# Patient Record
Sex: FEMALE | Race: WHITE
[De-identification: names, ages, dates, MRNs, and addresses within clinical notes are randomized per-mention and may not be internally consistent; named-entity substitution may affect disease eponyms.]

---

## 2019-11-08 ENCOUNTER — HOSPITAL ENCOUNTER (EMERGENCY)
Dept: HOSPITAL 43 - DL.ED | Age: 59
Discharge: HOME | End: 2019-11-08
Payer: COMMERCIAL

## 2019-11-08 DIAGNOSIS — E66.9: ICD-10-CM

## 2019-11-08 DIAGNOSIS — Z79.899: ICD-10-CM

## 2019-11-08 DIAGNOSIS — N13.2: Primary | ICD-10-CM

## 2019-11-08 DIAGNOSIS — I10: ICD-10-CM

## 2019-11-08 LAB
ANION GAP SERPL CALC-SCNC: 12.1 MMOL/L
CHLORIDE SERPL-SCNC: 102 MMOL/L (ref 101–111)
SODIUM SERPL-SCNC: 135 MMOL/L (ref 135–145)

## 2019-11-08 PROCEDURE — 83690 ASSAY OF LIPASE: CPT

## 2019-11-08 PROCEDURE — 96375 TX/PRO/DX INJ NEW DRUG ADDON: CPT

## 2019-11-08 PROCEDURE — 80053 COMPREHEN METABOLIC PANEL: CPT

## 2019-11-08 PROCEDURE — 99284 EMERGENCY DEPT VISIT MOD MDM: CPT

## 2019-11-08 PROCEDURE — 74176 CT ABD & PELVIS W/O CONTRAST: CPT

## 2019-11-08 PROCEDURE — 36415 COLL VENOUS BLD VENIPUNCTURE: CPT

## 2019-11-08 PROCEDURE — 96374 THER/PROPH/DIAG INJ IV PUSH: CPT

## 2019-11-08 PROCEDURE — 85025 COMPLETE CBC W/AUTO DIFF WBC: CPT

## 2019-11-08 PROCEDURE — 82150 ASSAY OF AMYLASE: CPT

## 2019-11-08 PROCEDURE — 81001 URINALYSIS AUTO W/SCOPE: CPT

## 2019-11-08 RX ADMIN — SODIUM CHLORIDE ONE MG: 9 INJECTION, SOLUTION INTRAVENOUS at 13:05

## 2019-11-08 RX ADMIN — Medication PRN ML: at 13:05

## 2019-11-08 RX ADMIN — KETOROLAC TROMETHAMINE ONE MG: 30 INJECTION, SOLUTION INTRAMUSCULAR at 13:05

## 2019-11-08 NOTE — EDM.PDOC
ED HPI GENERAL MEDICAL PROBLEM





- General


Chief Complaint: Genitourinary Problem


Stated Complaint: KIDNEY STONES?


Time Seen by Provider: 11/08/19 12:25


Source of Information: Reports: Patient, RN, RN Notes Reviewed


History Limitations: Reports: No Limitations





- History of Present Illness


INITIAL COMMENTS - FREE TEXT/NARRATIVE: 


Pt sent to ER from [facility] walk in clinic with concern for possible kidney 

stone due to recurring episodes of severe right flank pain. Pt denies fever, 

chills, or dysuria. No prior Hx of kidney stones. Denies recent injury or heavy 

lifting.





Onset: Sudden


Onset Date: 11/07/19


Duration: Intermittent, Recurring


Location: Reports: Abdomen, Back


Quality: Reports: Ache


Severity: Severe


Improves with: Reports: None


Worsens with: Reports: None


Associated Symptoms: Reports: No Other Symptoms


  ** Right Flank


Pain Score (Numeric/FACES): 8





- Related Data


 Allergies











Allergy/AdvReac Type Severity Reaction Status Date / Time


 


No Known Allergies Allergy   Verified 11/08/19 12:31











Home Meds: 


 Home Meds





Estradiol [Estrace] 2 mg PO DAILY 11/08/19 [History]


Lisinopril 5 mg PO DAILY 11/08/19 [History]











Past Medical History


Cardiovascular History: Reports: Hypertension





Social & Family History





- Family History


Family Medical History: Noncontributory





- Living Situation & Occupation


Living situation: Reports: with Family





ED ROS GENERAL





- Review of Systems


Review Of Systems: ROS reveals no pertinent complaints other than HPI.





ED EXAM, RENAL/





- Physical Exam


Exam: See Below


Exam Limited By: No Limitations


General Appearance: Alert, WD/WN, No Apparent Distress, Obese


Head: Atraumatic, Normocephalic


Neck: Normal Inspection


Respiratory/Chest: No Respiratory Distress, Lungs Clear, Normal Breath Sounds, 

No Accessory Muscle Use, Chest Non-Tender


Cardiovascular: Normal Peripheral Pulses, Regular Rate, Rhythm, No Edema, No 

Gallop, No JVD, No Murmur, No Rub


GI/Abdominal: Normal Bowel Sounds, Soft, Non-Tender, No Organomegaly, No 

Distention, No Abnormal Bruit, No Mass


Back Exam: Normal Inspection, Full Range of Motion.  No: CVA Tenderness (L), 

CVA Tenderness (R)


Extremities: Normal Inspection


Neurological: Alert, Oriented, No Motor/Sensory Deficits


Psychiatric: Normal Affect, Normal Mood


Skin Exam: Warm, Dry, Intact, Normal Color, No Rash





Course





- Vital Signs


Last Recorded V/S: 


 Last Vital Signs











Temp  97.9 F   11/08/19 12:35


 


Pulse  80   11/08/19 12:35


 


Resp  16   11/08/19 12:35


 


BP  144/68 H  11/08/19 12:35


 


Pulse Ox  99   11/08/19 12:35














- Orders/Labs/Meds


Orders: 


 Active Orders 24 hr











 Category Date Time Status


 


 Peripheral IV Care [RC] .AS DIRECTED Care  11/08/19 12:36 Active


 


 Abdomen Pelvis wo Cont [CT] Stat Exams  11/08/19 13:00 Taken


 


 AMYLASE [CHEM] Stat Lab  11/08/19 12:50 Received


 


 COMPREHENSIVE METABOLIC PN,CMP [CHEM] Stat Lab  11/08/19 12:50 Received


 


 LIPASE [CHEM] Stat Lab  11/08/19 12:50 Received


 


 Sodium Chloride 0.9% [Saline Flush] Med  11/08/19 12:35 Active





 10 ml FLUSH ASDIRECTED PRN   


 


 Peripheral IV Insertion Adult [OM.PC] Routine Oth  11/08/19 12:35 Ordered








 Medication Orders





Sodium Chloride (Saline Flush)  10 ml FLUSH ASDIRECTED PRN


   PRN Reason: Keep Vein Open


   Last Admin: 11/08/19 13:05  Dose: 10 ml








Labs: 


 Laboratory Tests











  11/08/19 11/08/19 Range/Units





  12:15 12:50 


 


WBC   10.6 H  (5.0-10.0)  10^3/uL


 


RBC   4.54  (4.2-5.4)  10^6/uL


 


Hgb   13.9  (12.0-16.0)  g/dL


 


Hct   41.0  (37.0-47.0)  %


 


MCV   90.3  ()  fL


 


MCH   30.6  (27.0-34.0)  pg


 


MCHC   33.9  (33.0-35.0)  g/dL


 


Plt Count   310  (150-450)  10^3/uL


 


Neut % (Auto)   70.5  (42.2-75.2)  %


 


Lymph % (Auto)   15.6 L  (20.5-50.1)  %


 


Mono % (Auto)   12.9 H  (2-8)  %


 


Eos % (Auto)   0.8 L  (1.0-3.0)  %


 


Baso % (Auto)   0.2  (0.0-1.0)  %


 


Urine Color  Yellow   (YELLOW)  


 


Urine Appearance  Slightly cloudy   (CLEAR)  


 


Urine pH  5.5   (5.0-9.0)  


 


Ur Specific Gravity  >= 1.030   (1.005-1.030)  


 


Urine Protein  Negative   (NEGATIVE)  


 


Urine Glucose (UA)  Negative   (NEGATIVE)  


 


Urine Ketones  Negative   (NEGATIVE)  


 


Urine Occult Blood  Trace-intact H   (NEGATIVE)  


 


Urine Nitrite  Negative   (NEGATIVE)  


 


Urine Bilirubin  Negative   (NEGATIVE)  


 


Urine Urobilinogen  0.2   (0.2-1.0)  mg/dL


 


Ur Leukocyte Esterase  Negative   (NEGATIVE)  


 


Urine RBC  0-5   /HPF


 


Urine WBC  0-5   (0-5/HPF)  /HPF


 


Ur Epithelial Cells  Moderate H   (NOT SEEN)  /HPF


 


Urine Bacteria  Few   (0-FEW/HPF)  /HPF


 


Urine Mucus  Few H   (NOT SEEN)  /LPF











Meds: 


Medications











Generic Name Dose Route Start Last Admin





  Trade Name Freq  PRN Reason Stop Dose Admin


 


Sodium Chloride  10 ml  11/08/19 12:35  11/08/19 13:05





  Saline Flush  FLUSH   10 ml





  ASDIRECTED PRN   Administration





  Keep Vein Open   





     





     





     














Discontinued Medications














Generic Name Dose Route Start Last Admin





  Trade Name Freq  PRN Reason Stop Dose Admin


 


Ketorolac Tromethamine  30 mg  11/08/19 12:59  11/08/19 13:05





  Toradol  IVPUSH  11/08/19 13:00  30 mg





  ONETIME ONE   Administration





     





     





     





     


 


Ondansetron HCl  4 mg  11/08/19 12:59  11/08/19 13:05





  Zofran  IV  11/08/19 13:00  4 mg





  ONETIME ONE   Administration





     





     





     





     














- Radiology Interpretation


Free Text/Narrative:: 


CT Abd/Pelvis: Rt distal ureter stone, see Rad. report.





Departure





- Departure


Time of Disposition: 13:17


Disposition: Home, Self-Care 01


Condition: Good


Clinical Impression: 


 Kidney stone on right side








- Discharge Information


*PRESCRIPTION DRUG MONITORING PROGRAM REVIEWED*: No


*COPY OF PRESCRIPTION DRUG MONITORING REPORT IN PATIENT TONJA: No


Instructions:  Renal Colic, Easy-to-Read, Kidney Stones, Easy-to-Read


Forms:  ED Department Discharge


Additional Instructions: 


Rx: Percocet (Oxycodone APAP) 5mg/325mg  *Do not drive while under the 

influence of this medication.


Rx: Zofran 4mg


Rx: Flomax 0.4mg


Rx: Cipro 500mg


Drink plenty of water.


Follow up in clinic if not improved in 3 to 4 days. Consider a urology referral 

with your clinic doctor if not improved in 1 week.


Return to ER if the pain is uncontrolled, or if you develop a fever.





- My Orders


Last 24 Hours: 


My Active Orders





11/08/19 12:35


Sodium Chloride 0.9% [Saline Flush]   10 ml FLUSH ASDIRECTED PRN 


Peripheral IV Insertion Adult [OM.PC] Routine 





11/08/19 12:36


Peripheral IV Care [RC] .AS DIRECTED 





11/08/19 12:50


AMYLASE [CHEM] Stat 


COMPREHENSIVE METABOLIC PN,CMP [CHEM] Stat 


LIPASE [CHEM] Stat 





11/08/19 13:00


Abdomen Pelvis wo Cont [CT] Stat 














- Assessment/Plan


Last 24 Hours: 


My Active Orders





11/08/19 12:35


Sodium Chloride 0.9% [Saline Flush]   10 ml FLUSH ASDIRECTED PRN 


Peripheral IV Insertion Adult [OM.PC] Routine 





11/08/19 12:36


Peripheral IV Care [RC] .AS DIRECTED 





11/08/19 12:50


AMYLASE [CHEM] Stat 


COMPREHENSIVE METABOLIC PN,CMP [CHEM] Stat 


LIPASE [CHEM] Stat 





11/08/19 13:00


Abdomen Pelvis wo Cont [CT] Stat

## 2021-10-07 ENCOUNTER — HOSPITAL ENCOUNTER (OUTPATIENT)
Dept: HOSPITAL 43 - DL.ENDO | Age: 61
Discharge: HOME | End: 2021-10-07
Attending: INTERNAL MEDICINE
Payer: COMMERCIAL

## 2021-10-07 DIAGNOSIS — Z98.890: ICD-10-CM

## 2021-10-07 DIAGNOSIS — D12.4: ICD-10-CM

## 2021-10-07 DIAGNOSIS — E66.09: ICD-10-CM

## 2021-10-07 DIAGNOSIS — Z12.11: Primary | ICD-10-CM

## 2021-10-07 DIAGNOSIS — R73.9: ICD-10-CM

## 2021-10-07 DIAGNOSIS — N18.9: ICD-10-CM

## 2021-10-07 DIAGNOSIS — E78.5: ICD-10-CM

## 2021-10-07 DIAGNOSIS — I12.9: ICD-10-CM

## 2021-10-07 PROCEDURE — 45385 COLONOSCOPY W/LESION REMOVAL: CPT

## 2021-10-07 NOTE — OR
DATE:  10/07/2021

 

PROCEDURE:  Total colonoscopy, NBI, and multiple cold snare polypectomies.

 

INSTRUMENT USED:  [id number] Olympus video colonoscope.

 

PREMEDICATIONS:  Fentanyl 100 mcg intravenous, Versed 3 mg intravenous.  Nasal

O2 cannula.

 

The procedure was done under pulse oximetry, BP recording, and cardiac monitor.

 

INDICATION:  The patient with previous colonic adenoma, surveillance

colonoscopic examination is done for detection of any polypoid lesions and

removal, endoscopic hemostasis therapy if needed.

 

DESCRIPTION OF PROCEDURE:  Initial rectal exam was unremarkable.  Rigid anoscopy

was normal.  The colonoscope was passed with ease to the ileocecal area.

Photographs were taken of the normal-appearing cecum identified by double-bulged

ileocecal folds.  No bleeding was noted from any of the visualized areas at the

commencement of the examination.  The bowel preparation was found to be

adequate, [place] scale 2 in right colon, 3 in transverse and left colon, total

score 8.  No stricture.  No vascular ectasia.  No large isolated ulcerations

seen.  No evidence of diffuse inflammatory bowel disease in the form of

friability, contact bleeding, or ulcerations.  Probing the proximal sides of

folds and flexures using adequate distention and clearing up the stool material,

withdrawal of the scope was made.  In the mid descending colon area, diminutive

polyps 2 in number were noted, NBI views were obtained, photographs were taken

of the polyp, multiple cold snare polypectomies were done, the tissues were

retrieved and sent for histopathology.  No bleeding was noted from any of the

visualized areas at the completion of examination.

 

IMPRESSION:  Diminutive colonic polyps.

 

The patient tolerated the procedure well.

 

DD:  10/07/2021 07:34:45

DT:  10/07/2021 08:07:50

[facility]

Job #:  [id number]